# Patient Record
Sex: MALE | Race: ASIAN | NOT HISPANIC OR LATINO
[De-identification: names, ages, dates, MRNs, and addresses within clinical notes are randomized per-mention and may not be internally consistent; named-entity substitution may affect disease eponyms.]

---

## 2024-05-30 ENCOUNTER — NON-APPOINTMENT (OUTPATIENT)
Age: 74
End: 2024-05-30

## 2024-07-02 ENCOUNTER — APPOINTMENT (OUTPATIENT)
Dept: UROLOGY | Facility: CLINIC | Age: 74
End: 2024-07-02
Payer: MEDICARE

## 2024-07-02 VITALS
WEIGHT: 180 LBS | TEMPERATURE: 98.2 F | RESPIRATION RATE: 16 BRPM | SYSTOLIC BLOOD PRESSURE: 123 MMHG | HEART RATE: 90 BPM | HEIGHT: 70.5 IN | DIASTOLIC BLOOD PRESSURE: 76 MMHG | BODY MASS INDEX: 25.48 KG/M2

## 2024-07-02 DIAGNOSIS — N31.9 NEUROMUSCULAR DYSFUNCTION OF BLADDER, UNSPECIFIED: ICD-10-CM

## 2024-07-02 PROCEDURE — 51701 INSERT BLADDER CATHETER: CPT

## 2024-07-02 PROCEDURE — 99204 OFFICE O/P NEW MOD 45 MIN: CPT | Mod: 25

## 2024-07-23 ENCOUNTER — APPOINTMENT (OUTPATIENT)
Dept: UROLOGY | Facility: CLINIC | Age: 74
End: 2024-07-23
Payer: MEDICARE

## 2024-07-23 VITALS — DIASTOLIC BLOOD PRESSURE: 79 MMHG | SYSTOLIC BLOOD PRESSURE: 129 MMHG | HEART RATE: 76 BPM | OXYGEN SATURATION: 95 %

## 2024-07-23 DIAGNOSIS — R33.9 RETENTION OF URINE, UNSPECIFIED: ICD-10-CM

## 2024-07-23 PROCEDURE — 51784 ANAL/URINARY MUSCLE STUDY: CPT

## 2024-07-23 PROCEDURE — 51728 CYSTOMETROGRAM W/VP: CPT

## 2024-07-23 PROCEDURE — 99214 OFFICE O/P EST MOD 30 MIN: CPT | Mod: 25

## 2024-07-23 PROCEDURE — G2211 COMPLEX E/M VISIT ADD ON: CPT

## 2024-07-23 NOTE — REASON FOR VISIT
[TextEntry] : 73-year-old male who presents for urinary retention  Patient with a long history of BPH managed with finasteride and Flomax.  He suffered a fall in May and subsequently developed urinary retention.  He had no sensation of bladder fullness initially but reports that he occasionally experiences urge now.  He is unable to void volitionally.  He did see Dr. Lau who taught him CIC and referred him for urodynamics testing.   He denies any other neurologic changes since the fall.  Denies any changes in the bowels.  He is with his son today who provides translation, he is Chinese speaking.  He is scheduled for cardiac stent placement in 1 week.  He developed hypotension and currently takes midodrine.  He had to stop the Flomax so he is only on finasteride at this time.

## 2024-07-23 NOTE — ASSESSMENT
[FreeTextEntry1] : 73-year-old male who presents for urinary retention  Patient underwent UDS today.  Reviewed the findings which showed large capacity, normal sensation, detrusor underactivity and no flow.  The patient reports that since starting CIC, he has had more sensation of bladder fullness.  It has only been 2 months since his injury.  He is planning for cardiac catheterization procedure in 1 week.    Reiterated importance of cath volumes around 500 cc.  Patient to follow-up with Dr. Lau to discuss next steps

## 2024-09-18 ENCOUNTER — APPOINTMENT (OUTPATIENT)
Dept: UROLOGY | Facility: CLINIC | Age: 74
End: 2024-09-18
Payer: MEDICARE

## 2024-09-18 DIAGNOSIS — R33.9 RETENTION OF URINE, UNSPECIFIED: ICD-10-CM

## 2024-09-18 PROCEDURE — G2211 COMPLEX E/M VISIT ADD ON: CPT

## 2024-09-18 PROCEDURE — 99213 OFFICE O/P EST LOW 20 MIN: CPT

## 2024-09-18 NOTE — HISTORY OF PRESENT ILLNESS
[FreeTextEntry1] : 7/2/24 - This 74 yo Persian speaking male presents with family for eval of long-standing BPH.  he lves in  [facility] biut sons have brought him north for care. He has been maintained on Finasteride for several years but one month ago he fell after a short time on Tamsulosin. Tamsulosin then DC'd. When hospitalized, he was diagnosed with urinary retention > q-2 liters. CT scan at time noted the distended bladder and mild bilateral hydronephrosis. Knutson has now been in place x 2 months because he failed a TOV s/p DC from hospital, at urologist office in [place] - where he lives.  He presents here for second opinion. He was told his enlarged prostate has caused prolonged bladder distention resulting in marked dysfunction. Was told this will result in a life-time knutson. However, review of records note discussions of an outlet procedure for patient and family confused. Never radiation, never PNB. Previous records from Feb 2024 reveal a 40g prostate and a PSA 0.8 Cystoscopy done in [place] revealed no masses, 3+ trabeculation. No ureteral stricture, stones or diverticulum  PMH: Chronic OAB, BPH with LUTs, Microscopic hematuria, HTN PSH: Hernia repair Social: Former smoker - quit 40 years ago   9/24 opted for TEB to review next steps. last visit taught CIC which she has been doing 4 times a day had Urodynamics noting large capacity bladder with no real contraction. he notes some sensatio nto pee and occasional leakage which strains for BM

## 2024-09-18 NOTE — ASSESSMENT
[FreeTextEntry1] : needs more time before decision for ATURP if done now likely not to void -  would repeat urodynamics 3 months he may be seeing ,local urologist - so will let us know